# Patient Record
Sex: MALE | Race: AMERICAN INDIAN OR ALASKA NATIVE
[De-identification: names, ages, dates, MRNs, and addresses within clinical notes are randomized per-mention and may not be internally consistent; named-entity substitution may affect disease eponyms.]

---

## 2019-04-25 ENCOUNTER — HOSPITAL ENCOUNTER (EMERGENCY)
Dept: HOSPITAL 5 - ED | Age: 54
LOS: 1 days | Discharge: HOME | End: 2019-04-26
Payer: MEDICARE

## 2019-04-25 DIAGNOSIS — Z53.21: ICD-10-CM

## 2019-04-25 DIAGNOSIS — R06.00: Primary | ICD-10-CM

## 2019-04-25 PROCEDURE — 70360 X-RAY EXAM OF NECK: CPT

## 2019-04-26 VITALS — DIASTOLIC BLOOD PRESSURE: 79 MMHG | SYSTOLIC BLOOD PRESSURE: 121 MMHG

## 2021-11-21 ENCOUNTER — HOSPITAL ENCOUNTER (EMERGENCY)
Dept: HOSPITAL 5 - ED | Age: 56
LOS: 1 days | Discharge: PSYCHIATRIC OR SUBSTANCE USE FACILITY | End: 2021-11-22
Payer: MEDICARE

## 2021-11-21 DIAGNOSIS — F32.9: ICD-10-CM

## 2021-11-21 DIAGNOSIS — Z20.822: ICD-10-CM

## 2021-11-21 DIAGNOSIS — R45.851: Primary | ICD-10-CM

## 2021-11-21 LAB
BASOPHILS # (AUTO): 0.1 K/MM3 (ref 0–0.1)
BASOPHILS NFR BLD AUTO: 0.6 % (ref 0–1.8)
BENZODIAZEPINES SCREEN,URINE: (no result)
BILIRUB UR QL STRIP: (no result)
BLOOD UR QL VISUAL: (no result)
BUN SERPL-MCNC: 15 MG/DL (ref 9–20)
BUN/CREAT SERPL: 13 %
CALCIUM SERPL-MCNC: 9.7 MG/DL (ref 8.4–10.2)
EOSINOPHIL # BLD AUTO: 0 K/MM3 (ref 0–0.4)
EOSINOPHIL NFR BLD AUTO: 0.4 % (ref 0–4.3)
HCT VFR BLD CALC: 46.3 % (ref 35.5–45.6)
HEMOLYSIS INDEX: 15
HGB BLD-MCNC: 15.3 GM/DL (ref 11.8–15.2)
LYMPHOCYTES # BLD AUTO: 2.4 K/MM3 (ref 1.2–5.4)
LYMPHOCYTES NFR BLD AUTO: 25.2 % (ref 13.4–35)
MCHC RBC AUTO-ENTMCNC: 33 % (ref 32–34)
MCV RBC AUTO: 95 FL (ref 84–94)
METHADONE SCREEN,URINE: (no result)
MONOCYTES # (AUTO): 0.7 K/MM3 (ref 0–0.8)
MONOCYTES % (AUTO): 7.3 % (ref 0–7.3)
MUCOUS THREADS #/AREA URNS HPF: (no result) /HPF
OPIATE SCREEN,URINE: (no result)
PH UR STRIP: 6 [PH] (ref 5–7)
PLATELET # BLD: 232 K/MM3 (ref 140–440)
RBC # BLD AUTO: 4.86 M/MM3 (ref 3.65–5.03)
RBC #/AREA URNS HPF: 74 /HPF (ref 0–6)
RENAL EPI CELLS #/AREA URNS LPF: 7 /LPF
UROBILINOGEN UR-MCNC: 2 MG/DL (ref ?–2)
WBC #/AREA URNS HPF: 2 /HPF (ref 0–6)

## 2021-11-21 PROCEDURE — 80307 DRUG TEST PRSMV CHEM ANLYZR: CPT

## 2021-11-21 PROCEDURE — 85025 COMPLETE CBC W/AUTO DIFF WBC: CPT

## 2021-11-21 PROCEDURE — 80320 DRUG SCREEN QUANTALCOHOLS: CPT

## 2021-11-21 PROCEDURE — G0480 DRUG TEST DEF 1-7 CLASSES: HCPCS

## 2021-11-21 PROCEDURE — 36415 COLL VENOUS BLD VENIPUNCTURE: CPT

## 2021-11-21 PROCEDURE — 81001 URINALYSIS AUTO W/SCOPE: CPT

## 2021-11-21 PROCEDURE — 99285 EMERGENCY DEPT VISIT HI MDM: CPT

## 2021-11-21 PROCEDURE — 80048 BASIC METABOLIC PNL TOTAL CA: CPT

## 2021-11-21 PROCEDURE — U0003 INFECTIOUS AGENT DETECTION BY NUCLEIC ACID (DNA OR RNA); SEVERE ACUTE RESPIRATORY SYNDROME CORONAVIRUS 2 (SARS-COV-2) (CORONAVIRUS DISEASE [COVID-19]), AMPLIFIED PROBE TECHNIQUE, MAKING USE OF HIGH THROUGHPUT TECHNOLOGIES AS DESCRIBED BY CMS-2020-01-R: HCPCS

## 2021-11-21 NOTE — EMERGENCY DEPARTMENT REPORT
HPI





- General


Chief Complaint: Psych


Time Seen by Provider: 21 19:18





- HPI


HPI: 





56-year-old -American male presents to the emergency department with a 

complaint of depression, anxiety, auditory hallucinations, and suicidal 

ideations the patient has a history of major depressive disorder, bipolar 

disorder, and generalized anxiety disorder.  He has been off of his medications 

for the past 1.5 months including Seroquel 300 mg at night, Wellbutrin 300 mg in

the morning, and Vistaril as needed for anxiety.  He says that his brother, who 

lives here, recently  and he has been having significant depression and 

suicidal ideations.  Patient just arrived here from [place], where he was 

previously receiving some psychiatric treatment and says that he "cannot even go

back to the house because I know what will happen."  The patient says that he 

does have a history of a suicide attempt by drug overdose in the past and is 

concerned that if he returns to the family home that he will do so again.  He 

has a past medical history of hypertension.  He smokes cigars.  He denies any 

illicit drug use.





ED Past Medical Hx





- Past Medical History


Hx Psychiatric Treatment: Yes (PTSD, Depression)





- Surgical History


Additional Surgical History: Uvula Removed





- Social History


Smoking Status: Never Smoker


Substance Use Type: None





ED Review of Systems


ROS: 


Stated complaint: PTSD,DEPRESSION,HEARING VOICES,SI


Other details as noted in HPI





Comment: All other systems reviewed and negative


Constitutional: denies: chills, fever


Eyes: denies: eye pain, vision change


ENT: denies: ear pain, throat pain


Cardiovascular: denies: chest pain, palpitations


Gastrointestinal: denies: abdominal pain, vomiting


Musculoskeletal: denies: back pain, arthralgia


Neurological: denies: headache, weakness


Psychiatric: anxiety, depression, auditory hallucinations, suicidal thoughts





Physical Exam





- Physical Exam


Vital Signs: 


                                   Vital Signs











  21





  18:43


 


Temperature 98.7 F


 


Pulse Rate 83


 


Respiratory 18





Rate 


 


Blood Pressure 157/100





[Right] 


 


O2 Sat by Pulse 100





Oximetry 











Physical Exam: 





GENERAL: The patient is well-developed well-nourished.


HENT: Normocephalic.  Atraumatic.    Patient has moist mucous membranes.


EYES: Extraocular motions are intact. 


NECK: Supple. Trachea is midline.


CHEST/LUNGS: Clear to auscultation.  There is no respiratory distress noted.


HEART/CARDIOVASCULAR: Regular.  There is no tachycardia.  There is no murmur.


ABDOMEN: Abdomen is soft, nontender.  Patient has normal bowel sounds.  


SKIN: Skin is warm and dry. 


NEURO: The patient is awake, alert, and oriented.  The patient is cooperative.  

Normal speech.


MUSCULOSKELETAL: There is no tenderness or deformity.  There is no limitation 

range of motion. 





ED Course


                                   Vital Signs











  21





  18:43


 


Temperature 98.7 F


 


Pulse Rate 83


 


Respiratory 18





Rate 


 


Blood Pressure 157/100





[Right] 


 


O2 Sat by Pulse 100





Oximetry 














ED Medical Decision Making





- Lab Data


Result diagrams: 


                                 21 19:38





                                 21 19:38





                                   Lab Results











  21 Range/Units





  19:38 19:38 19:38 


 


WBC  9.7    (4.5-11.0)  K/mm3


 


RBC  4.86    (3.65-5.03)  M/mm3


 


Hgb  15.3 H    (11.8-15.2)  gm/dl


 


Hct  46.3 H    (35.5-45.6)  %


 


MCV  95 H    (84-94)  fl


 


MCH  32    (28-32)  pg


 


MCHC  33    (32-34)  %


 


RDW  13.8    (13.2-15.2)  %


 


Plt Count  232    (140-440)  K/mm3


 


Lymph % (Auto)  25.2    (13.4-35.0)  %


 


Mono % (Auto)  7.3    (0.0-7.3)  %


 


Eos % (Auto)  0.4    (0.0-4.3)  %


 


Baso % (Auto)  0.6    (0.0-1.8)  %


 


Lymph # (Auto)  2.4    (1.2-5.4)  K/mm3


 


Mono # (Auto)  0.7    (0.0-0.8)  K/mm3


 


Eos # (Auto)  0.0    (0.0-0.4)  K/mm3


 


Baso # (Auto)  0.1    (0.0-0.1)  K/mm3


 


Seg Neutrophils %  66.5    (40.0-70.0)  %


 


Seg Neutrophils #  6.4    (1.8-7.7)  K/mm3


 


Sodium   141   (137-145)  mmol/L


 


Potassium   4.2   (3.6-5.0)  mmol/L


 


Chloride   102.1   ()  mmol/L


 


Carbon Dioxide   24   (22-30)  mmol/L


 


Anion Gap   19   mmol/L


 


BUN   15   (9-20)  mg/dL


 


Creatinine   1.2   (0.8-1.3)  mg/dL


 


Estimated GFR   > 60   ml/min


 


BUN/Creatinine Ratio   13   %


 


Glucose   82   ()  mg/dL


 


Calcium   9.7   (8.4-10.2)  mg/dL


 


Plasma/Serum Alcohol    < 0.01  (0-0.07)  %














- Medical Decision Making





This patient presents to the emergency department for a mental health 

evaluation. He has a history of depression and says that he is extremely 

depressed and feeling suicidal with his brothers recent passing and his 

noncompliance with medication. For this reason the patient was made a 1013 and 

placed on ED hold. He was seen by the psychiatric  and also appeared to 

have some command hallucinations.





Patient's labs have thus far been unremarkable including CBC, metabolic panel 

and blood alcohol level. We are waiting for a urine sample for urinalysis and 

UDS. I suspect UDS will be positive for cocaine. The patient does not appear 

acutely intoxicated. Vital signs reassuring including being afebrile. If the 

patient ends up having a urinary tract infection then antibiotics will need to 

be added. Otherwise this patient is medically cleared for psychiatric placement.


Critical Care Time: No


Critical care attestation.: 


If time is entered above; I have spent that time in minutes in the direct care 

of this critically ill patient, excluding procedure time.








ED Disposition


Clinical Impression: 


 Depression, Suicidal ideations





Disposition: [address] [place] [address]


Is pt being admited?: No


Condition: Stable


Time of Disposition: 23:03

## 2021-11-22 VITALS — SYSTOLIC BLOOD PRESSURE: 113 MMHG | DIASTOLIC BLOOD PRESSURE: 51 MMHG

## 2021-11-22 NOTE — CONSULTATION
History of Present Illness





- Reason for Consult


Consult date: 11/22/21


Reason for consult: SI





- History of Present Psychiatric Illness


The patient was seen today. He says he is depressed and feels suicidal. The 

patient denies having a plan. The patient says he's been feeling like this for 

about a month. But states he was out of [place] and ran out of his meds. He says ramiro irizarry flew back here to take care of his mental health. The patient also says he's 

hearing voices telling him to harm himself. He says he went to [facility] but 

they did not have a bed. He says he has a history of Bipolar. The patient says 

"I need to go somewhere for help."





PAST PSYCHIATRIC HISTORY:


Diagnoses: Bipolar


Suicide attempts or Self-harm behavior: Denies


Prior psychiatric hospitalizations: Yes


Substance Abuse history: Denies


Previous psychiatric medications tried: Seroquel 300, Wellbutrin 300


Outpatient treatment: Yes





PAST MEDICAL HISTORY: None reported or document





Family Psychiatric History: None reported or documented





SOCIAL HISTORY


Marital Status: Single


Living Arrangements: alone


Employment Status: Disabled


Access to guns/weapons: Denies


Education: 


History of Abuse: Denies


Legal History: Denies





REVIEW OF SYSTEMS  


Constitutional: Negative for weight loss


ENT: Negative for stridor


Respiratory: Negative for cough or hemoptysis


All other systems reviewed and are negative





MENTAL STATUS EXAMINATION


General Appearance and Behavior: Age appropriate, good hygiene, wearing 

appropriate clothes. calm, cooperative


Cooperation: cooperative


Psychomotor Behavior: Psychomotor normal


Mood: depressed


Affect and affective range: congruent with stated mood


Thought Process: goal directed


Thought Content: hallucinations, depression


Speech: normal tone and pace


Suicidal Ideation: yes


Homicidal Ideation: Denies


Hallucinations: Auditory 


Delusions: none elicited


Impulse Control: impaired


Insight and Judgment: Limited


Memory: limited


Attention: Attentive


Orientation: alert and oriented





 Assessment and Plan 


(1) Bipolar Disorder





Treatment


1013


Seroquel 300mg po daily


Wellbutrin 300mg po daily


Sitter: per primary


Medical: per primary


Disposition: recommend acute psychiatric inpatient treatment


Will follow. Thanks


Case staffed with Dr. Zhong 





Medications and Allergies


                                    Allergies











Allergy/AdvReac Type Severity Reaction Status Date / Time


 


haloperidol [From Haldol] AdvReac  Unknown Verified 11/21/21 18:48














Mental Status Exam





- Vital signs


                                Last Vital Signs











Temp  98.4 F   11/22/21 08:46


 


Pulse  80   11/22/21 08:46


 


Resp  16   11/22/21 08:46


 


BP  113/51   11/22/21 08:46


 


Pulse Ox  100   11/22/21 08:46














Results


Result Diagrams: 


                                 11/21/21 19:38





                                 11/21/21 19:38


                              Abnormal lab results











  11/21/21 Range/Units





  19:38 


 


Hgb  15.3 H  (11.8-15.2)  gm/dl


 


Hct  46.3 H  (35.5-45.6)  %


 


MCV  95 H  (84-94)  fl








All other labs normal.

## 2025-02-12 NOTE — XRAY REPORT
Called the patient wife to try and reach Mr. Henderson. Patient wife stated that he maybe driving the school bus but will try to go ahead and let patient know I have been trying to reach him    PROCEDURE: XR NECK SOFT TISSUE 

 

TECHNIQUE:  AP and lateral views of the soft tissue neck were obtained. 

 

HISTORY: dysphagia 

 

COMPARISONS: None  

 

FINDINGS: 

 

The epiglottis and subglottic airway appear normal. The nasopharynx and hypopharynx appear normal. Th
e prevertebral soft tissues appear normal. There is multilevel disc degeneration in the cervical spin
e with a plate spurring. There is no evidence of foreign body. 

 

IMPRESSION:  

 

No significant findings.. 

 

This document is electronically signed by Richmond Michael MD., April 26 2019 01:56:35 AM ET